# Patient Record
Sex: FEMALE | Race: BLACK OR AFRICAN AMERICAN | NOT HISPANIC OR LATINO | Employment: UNEMPLOYED | URBAN - METROPOLITAN AREA
[De-identification: names, ages, dates, MRNs, and addresses within clinical notes are randomized per-mention and may not be internally consistent; named-entity substitution may affect disease eponyms.]

---

## 2023-06-09 ENCOUNTER — APPOINTMENT (OUTPATIENT)
Dept: ULTRASOUND IMAGING | Facility: HOSPITAL | Age: 28
End: 2023-06-09
Payer: MEDICAID

## 2023-06-09 ENCOUNTER — HOSPITAL ENCOUNTER (EMERGENCY)
Facility: HOSPITAL | Age: 28
Discharge: HOME | End: 2023-06-09
Attending: OBSTETRICS & GYNECOLOGY | Admitting: OBSTETRICS & GYNECOLOGY
Payer: MEDICAID

## 2023-06-09 ENCOUNTER — HOSPITAL ENCOUNTER (EMERGENCY)
Facility: HOSPITAL | Age: 28
End: 2023-06-09

## 2023-06-09 VITALS
SYSTOLIC BLOOD PRESSURE: 95 MMHG | RESPIRATION RATE: 16 BRPM | DIASTOLIC BLOOD PRESSURE: 65 MMHG | HEART RATE: 66 BPM | OXYGEN SATURATION: 98 % | TEMPERATURE: 98.4 F

## 2023-06-09 LAB
ABO GROUP BLD: NORMAL
AMPHET+METHAMPHET UR QL: NEGATIVE
BACTERIA UR QL AUTO: ABNORMAL /HPF
BARBITURATES UR QL SCN: NEGATIVE
BASOPHILS # BLD AUTO: 0.03 10*3/MM3 (ref 0–0.2)
BASOPHILS NFR BLD AUTO: 0.3 % (ref 0–1.5)
BENZODIAZ UR QL SCN: NEGATIVE
BILIRUB UR QL STRIP: NEGATIVE
BLD GP AB SCN SERPL QL: NEGATIVE
CANNABINOIDS SERPL QL: NEGATIVE
CLARITY UR: CLEAR
COCAINE UR QL: NEGATIVE
COLOR UR: YELLOW
DEPRECATED RDW RBC AUTO: 40.4 FL (ref 37–54)
EOSINOPHIL # BLD AUTO: 0.1 10*3/MM3 (ref 0–0.4)
EOSINOPHIL NFR BLD AUTO: 1.1 % (ref 0.3–6.2)
ERYTHROCYTE [DISTWIDTH] IN BLOOD BY AUTOMATED COUNT: 13 % (ref 12.3–15.4)
FENTANYL UR-MCNC: NEGATIVE NG/ML
GLUCOSE UR STRIP-MCNC: ABNORMAL MG/DL
HBV SURFACE AG SERPL QL IA: NORMAL
HCT VFR BLD AUTO: 32.8 % (ref 34–46.6)
HCV AB SER DONR QL: NORMAL
HGB BLD-MCNC: 11.3 G/DL (ref 12–15.9)
HGB UR QL STRIP.AUTO: NEGATIVE
HIV1+2 AB SER QL: NORMAL
HYALINE CASTS UR QL AUTO: ABNORMAL /LPF
IMM GRANULOCYTES # BLD AUTO: 0.24 10*3/MM3 (ref 0–0.05)
IMM GRANULOCYTES NFR BLD AUTO: 2.6 % (ref 0–0.5)
KETONES UR QL STRIP: ABNORMAL
LEUKOCYTE ESTERASE UR QL STRIP.AUTO: ABNORMAL
LYMPHOCYTES # BLD AUTO: 2.26 10*3/MM3 (ref 0.7–3.1)
LYMPHOCYTES NFR BLD AUTO: 24.1 % (ref 19.6–45.3)
MCH RBC QN AUTO: 29.4 PG (ref 26.6–33)
MCHC RBC AUTO-ENTMCNC: 34.5 G/DL (ref 31.5–35.7)
MCV RBC AUTO: 85.4 FL (ref 79–97)
METHADONE UR QL SCN: NEGATIVE
MONOCYTES # BLD AUTO: 0.65 10*3/MM3 (ref 0.1–0.9)
MONOCYTES NFR BLD AUTO: 6.9 % (ref 5–12)
NEUTROPHILS NFR BLD AUTO: 6.09 10*3/MM3 (ref 1.7–7)
NEUTROPHILS NFR BLD AUTO: 65 % (ref 42.7–76)
NITRITE UR QL STRIP: NEGATIVE
NRBC BLD AUTO-RTO: 0 /100 WBC (ref 0–0.2)
OPIATES UR QL: NEGATIVE
OXYCODONE UR QL SCN: NEGATIVE
PH UR STRIP.AUTO: 6 [PH] (ref 5–8)
PLATELET # BLD AUTO: 176 10*3/MM3 (ref 140–450)
PMV BLD AUTO: 11.9 FL (ref 6–12)
PROT UR QL STRIP: NEGATIVE
RBC # BLD AUTO: 3.84 10*6/MM3 (ref 3.77–5.28)
RBC # UR STRIP: ABNORMAL /HPF
REF LAB TEST METHOD: ABNORMAL
RH BLD: POSITIVE
RPR SER QL: NORMAL
SP GR UR STRIP: 1.02 (ref 1–1.03)
SQUAMOUS #/AREA URNS HPF: ABNORMAL /HPF
UROBILINOGEN UR QL STRIP: ABNORMAL
WBC # UR STRIP: ABNORMAL /HPF
WBC NRBC COR # BLD: 9.37 10*3/MM3 (ref 3.4–10.8)

## 2023-06-09 PROCEDURE — 76805 OB US >/= 14 WKS SNGL FETUS: CPT

## 2023-06-09 PROCEDURE — 81001 URINALYSIS AUTO W/SCOPE: CPT | Performed by: OBSTETRICS & GYNECOLOGY

## 2023-06-09 PROCEDURE — 59025 FETAL NON-STRESS TEST: CPT

## 2023-06-09 PROCEDURE — 76819 FETAL BIOPHYS PROFIL W/O NST: CPT | Performed by: OBSTETRICS & GYNECOLOGY

## 2023-06-09 PROCEDURE — 76819 FETAL BIOPHYS PROFIL W/O NST: CPT

## 2023-06-09 PROCEDURE — 99284 EMERGENCY DEPT VISIT MOD MDM: CPT | Performed by: OBSTETRICS & GYNECOLOGY

## 2023-06-09 PROCEDURE — 80307 DRUG TEST PRSMV CHEM ANLYZR: CPT | Performed by: OBSTETRICS & GYNECOLOGY

## 2023-06-09 PROCEDURE — 76805 OB US >/= 14 WKS SNGL FETUS: CPT | Performed by: OBSTETRICS & GYNECOLOGY

## 2023-06-09 PROCEDURE — 87081 CULTURE SCREEN ONLY: CPT | Performed by: OBSTETRICS & GYNECOLOGY

## 2023-06-09 PROCEDURE — 80081 OBSTETRIC PANEL INC HIV TSTG: CPT | Performed by: OBSTETRICS & GYNECOLOGY

## 2023-06-09 PROCEDURE — 76775 US EXAM ABDO BACK WALL LIM: CPT

## 2023-06-09 PROCEDURE — 86803 HEPATITIS C AB TEST: CPT | Performed by: OBSTETRICS & GYNECOLOGY

## 2023-06-09 RX ORDER — FERROUS SULFATE 325(65) MG
325 TABLET ORAL
COMMUNITY

## 2023-06-09 RX ORDER — LANOLIN ALCOHOL/MO/W.PET/CERES
1000 CREAM (GRAM) TOPICAL DAILY
COMMUNITY

## 2023-06-09 RX ORDER — PRENATAL VIT NO.126/IRON/FOLIC 28MG-0.8MG
TABLET ORAL DAILY
COMMUNITY

## 2023-06-09 NOTE — OBED NOTES
[facility]  Clarisse Magana  : 1995  MRN: [id number]  CSN: [id number]    OB ED Provider Note    Subjective   Chief Complaint   Patient presents with   • Back Pain   • Abdominal Pain     INGA: pt reports lower back pain that radiates around to lower abdomen since 0300.  Pt reports pain has become more persistent this morning.  Denies LOF or VB.  +FM>      Clarisse Magana is a 28 y.o. year old  with an Estimated Date of Delivery: 23 currently at 30w4d presenting with bilateral flank pain since last night, radiating to BLQ that has increased today to 6-7/10. She denies ROM or VB. FM is present.      Prenatal care has been in [place].  It has been benign.  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 She moved to the  last week.    OB History    Para Term  AB Living   1 0 0 0 0 0   SAB IAB Ectopic Molar Multiple Live Births   0 0 0 0 0 0      # Outcome Date GA Lbr Sean/2nd Weight Sex Delivery Anes PTL Lv   1 Current              No past medical history on file.  No past surgical history on file.  No current facility-administered medications for this encounter.    No Known Allergies  Social History    Tobacco Use      Smoking status: Not on file      Smokeless tobacco: Not on file    Review of Systems   Musculoskeletal: Positive for back pain.   All other systems reviewed and are negative.        Objective   /71 (BP Location: Right arm, Patient Position: Lying)   Pulse 92   Temp 98.4 °F (36.9 °C) (Oral)   Resp 16   SpO2 98%   General: well developed; well nourished  mildly distressed   Abdomen: soft, bilateral lower quadrant tenderness, 1+; no masses  gravid    FHT's: reactive and category 1      Cervix: was checked (by RN): 0 cm / 50 % / -3   Presentation: cephalic   Contractions: none   Chest: Unlabored respirations    CV:  RRR   Ext:   No C/C/E   Back: CVA tenderness is present bilateral        Prenatal Labs  No results found for: HGB, RUBELLAABIGG, HEPBSAG, LABRPR, ABORH,  ABSCRN, ABID, HKE2IZU1, HEPCVIRUSABY, GCT, GGTFASTING, ZGV5BATG, TVL5PZSR, DRW3SNDE, STREPGPB, URINECX, CHLAMNAA, NGONORRHON    Current Labs Reviewed   ABO & Rh: No results found for: LABABO, LABRH, ABID  CBC w/ diff:   Lab Results   Component Value Date    WBC 9.37 06/09/2023    NEUTRORELPCT 65.0 06/09/2023    AUTOIGPER 2.6 (H) 06/09/2023    LYMPHORELPCT 24.1 06/09/2023    MONORELPCT 6.9 06/09/2023    EOSRELPCT 1.1 06/09/2023    BASORELPCT 0.3 06/09/2023    HGB 11.3 (L) 06/09/2023    HCT 32.8 (L) 06/09/2023    MCV 85.4 06/09/2023    RDW 13.0 06/09/2023     06/09/2023     Prenatal Panel:   Lab Results   Component Value Date    HGB 11.3 (L) 06/09/2023    HEPBSAG Non-Reactive 06/09/2023    ABSCRN Negative 06/09/2023    AGV7ZTX0 Non-Reactive 06/09/2023    HEPCVIRUSABY Non-Reactive 06/09/2023     UA:    Lab Results   Component Value Date    SQUAMEPIUA 3-6 (A) 06/09/2023    SPECGRAVUR 1.022 06/09/2023    KETONESU Trace (A) 06/09/2023    BLOODU Negative 06/09/2023    LEUKOCYTESUR Moderate (2+) (A) 06/09/2023    NITRITEU Negative 06/09/2023    RBCUA 0-2 06/09/2023    WBCUA 3-5 (A) 06/09/2023    BACTERIA None Seen 06/09/2023     Clinical: Bilateral flank pain, 30 weeks gestation     FINDINGS: The right kidney measures 11.5 cm in length and is normal. The  left kidney measures 10.9 cm in length and is normal. No hydronephrosis,  mass or calculus. Bladder collapsed.     CONCLUSION: Normal sonogram of the kidneys.     This report was finalized on 6/9/2023 5:16 PM by Dr. Wilfredo Newsome M.D.    OB U/S single VTX IUP, posterior placenta, no gross anomalies 30 6/7       Assessment   1. IUP at 30w4d  2. Back pain- no obvious urinary source with no ureteral compression, contaminated U/A. At this point, symptoms are most likely MSK given lack of CTX or definitive urinary tract etiology     Plan   1. D/C home with instructions to return for worsening symptoms, acute changes.  Dr. Beltran's name and number provided to patient to  establish care.      Caio Olmstead MD  6/9/2023  13:07 EDT

## 2023-06-10 LAB — RUBV IGG SERPL IA-ACNC: 1.03 INDEX

## 2023-06-10 NOTE — NURSING NOTE
Patient discharged in stable conidition. Patient ambulatory. Patient discharged with written and verbal instructions/teaching and verbalized understanding aboout urgent maternal warning signs, signs and symptoms of labor, contractions, vaginal bleeding, leaking of fluid and decreased fetal movement. Patient given information  about making appointment with Dr. Beltran and phone number to the office.

## 2023-06-11 LAB — BACTERIA SPEC AEROBE CULT: NORMAL
